# Patient Record
Sex: FEMALE | URBAN - METROPOLITAN AREA
[De-identification: names, ages, dates, MRNs, and addresses within clinical notes are randomized per-mention and may not be internally consistent; named-entity substitution may affect disease eponyms.]

---

## 2022-05-17 ENCOUNTER — TELEPHONE (OUTPATIENT)
Dept: HEMATOLOGY/ONCOLOGY | Facility: CLINIC | Age: 47
End: 2022-05-17

## 2022-05-17 NOTE — TELEPHONE ENCOUNTER
Faxed and requested labs to support hem referral for dx of Anemia.      6/3/22    Refaxed medical records request.

## 2022-06-07 ENCOUNTER — TELEPHONE (OUTPATIENT)
Dept: HEMATOLOGY/ONCOLOGY | Facility: CLINIC | Age: 47
End: 2022-06-07

## 2022-06-07 NOTE — TELEPHONE ENCOUNTER
Attempted to contact referring physician office.  Not open yet, will try again later.   Supporting labs needed for hem referral received via fax. 3rd attempt requesting records